# Patient Record
Sex: FEMALE | Race: WHITE
[De-identification: names, ages, dates, MRNs, and addresses within clinical notes are randomized per-mention and may not be internally consistent; named-entity substitution may affect disease eponyms.]

---

## 2018-12-25 ENCOUNTER — HOSPITAL ENCOUNTER (EMERGENCY)
Dept: HOSPITAL 62 - ER | Age: 28
LOS: 1 days | Discharge: HOME | End: 2018-12-26
Payer: COMMERCIAL

## 2018-12-25 DIAGNOSIS — Z79.82: ICD-10-CM

## 2018-12-25 DIAGNOSIS — R11.0: ICD-10-CM

## 2018-12-25 DIAGNOSIS — R51: ICD-10-CM

## 2018-12-25 DIAGNOSIS — R42: Primary | ICD-10-CM

## 2018-12-25 LAB
ADD MANUAL DIFF: NO
ALBUMIN SERPL-MCNC: 4.3 G/DL (ref 3.5–5)
ALP SERPL-CCNC: 65 U/L (ref 38–126)
ALT SERPL-CCNC: 21 U/L (ref 9–52)
ANION GAP SERPL CALC-SCNC: 7 MMOL/L (ref 5–19)
APPEARANCE UR: CLEAR
APTT BLD: 27.7 SEC (ref 23.5–35.8)
APTT PPP: (no result) S
AST SERPL-CCNC: 19 U/L (ref 14–36)
BASOPHILS # BLD AUTO: 0 10^3/UL (ref 0–0.2)
BASOPHILS NFR BLD AUTO: 0.3 % (ref 0–2)
BILIRUB DIRECT SERPL-MCNC: 0.2 MG/DL (ref 0–0.4)
BILIRUB SERPL-MCNC: 0.4 MG/DL (ref 0.2–1.3)
BILIRUB UR QL STRIP: NEGATIVE
BUN SERPL-MCNC: 10 MG/DL (ref 7–20)
CALCIUM: 9.6 MG/DL (ref 8.4–10.2)
CHLORIDE SERPL-SCNC: 105 MMOL/L (ref 98–107)
CO2 SERPL-SCNC: 24 MMOL/L (ref 22–30)
EOSINOPHIL # BLD AUTO: 0 10^3/UL (ref 0–0.6)
EOSINOPHIL NFR BLD AUTO: 0.3 % (ref 0–6)
ERYTHROCYTE [DISTWIDTH] IN BLOOD BY AUTOMATED COUNT: 12.6 % (ref 11.5–14)
GLUCOSE SERPL-MCNC: 103 MG/DL (ref 75–110)
GLUCOSE UR STRIP-MCNC: NEGATIVE MG/DL
HCT VFR BLD CALC: 41.6 % (ref 36–47)
HGB BLD-MCNC: 14.4 G/DL (ref 12–15.5)
INR PPP: 0.92
KETONES UR STRIP-MCNC: NEGATIVE MG/DL
LYMPHOCYTES # BLD AUTO: 1.2 10^3/UL (ref 0.5–4.7)
LYMPHOCYTES NFR BLD AUTO: 14 % (ref 13–45)
MCH RBC QN AUTO: 30.1 PG (ref 27–33.4)
MCHC RBC AUTO-ENTMCNC: 34.5 G/DL (ref 32–36)
MCV RBC AUTO: 87 FL (ref 80–97)
MONOCYTES # BLD AUTO: 0.4 10^3/UL (ref 0.1–1.4)
MONOCYTES NFR BLD AUTO: 4 % (ref 3–13)
NEUTROPHILS # BLD AUTO: 7.2 10^3/UL (ref 1.7–8.2)
NEUTS SEG NFR BLD AUTO: 81.4 % (ref 42–78)
NITRITE UR QL STRIP: NEGATIVE
PH UR STRIP: 8 [PH] (ref 5–9)
PLATELET # BLD: 259 10^3/UL (ref 150–450)
POTASSIUM SERPL-SCNC: 4.3 MMOL/L (ref 3.6–5)
PROT SERPL-MCNC: 7.1 G/DL (ref 6.3–8.2)
PROT UR STRIP-MCNC: NEGATIVE MG/DL
PROTHROMBIN TIME: 12.8 SEC (ref 11.4–15.4)
RBC # BLD AUTO: 4.77 10^6/UL (ref 3.72–5.28)
SODIUM SERPL-SCNC: 136.2 MMOL/L (ref 137–145)
SP GR UR STRIP: 1.01
TOTAL CELLS COUNTED % (AUTO): 100 %
UROBILINOGEN UR-MCNC: NEGATIVE MG/DL (ref ?–2)
WBC # BLD AUTO: 8.8 10^3/UL (ref 4–10.5)

## 2018-12-25 PROCEDURE — 81001 URINALYSIS AUTO W/SCOPE: CPT

## 2018-12-25 PROCEDURE — 96375 TX/PRO/DX INJ NEW DRUG ADDON: CPT

## 2018-12-25 PROCEDURE — 70498 CT ANGIOGRAPHY NECK: CPT

## 2018-12-25 PROCEDURE — 85025 COMPLETE CBC W/AUTO DIFF WBC: CPT

## 2018-12-25 PROCEDURE — 96374 THER/PROPH/DIAG INJ IV PUSH: CPT

## 2018-12-25 PROCEDURE — 85610 PROTHROMBIN TIME: CPT

## 2018-12-25 PROCEDURE — 99284 EMERGENCY DEPT VISIT MOD MDM: CPT

## 2018-12-25 PROCEDURE — 85730 THROMBOPLASTIN TIME PARTIAL: CPT

## 2018-12-25 PROCEDURE — 70496 CT ANGIOGRAPHY HEAD: CPT

## 2018-12-25 PROCEDURE — 80053 COMPREHEN METABOLIC PANEL: CPT

## 2018-12-25 PROCEDURE — 81025 URINE PREGNANCY TEST: CPT

## 2018-12-25 PROCEDURE — 96361 HYDRATE IV INFUSION ADD-ON: CPT

## 2018-12-25 PROCEDURE — 36415 COLL VENOUS BLD VENIPUNCTURE: CPT

## 2018-12-26 VITALS — DIASTOLIC BLOOD PRESSURE: 76 MMHG | SYSTOLIC BLOOD PRESSURE: 123 MMHG

## 2018-12-26 NOTE — RADIOLOGY REPORT (SQ)
EXAM DESCRIPTION: 



CT NECK ANGIOGRAPHY WITHOUT THEN WITH IV CONTRAST



COMPLETED DATE/TME:  12/25/2018 22:06



CLINICAL HISTORY: 



27 years, Female, vertebral art diss



COMPARISON:

None.



TECHNIQUE:

762  Images stored on PACS.

 

All CT scanners at this facility use dose modulation, iterative

reconstruction, and/or weight based dosing when appropriate to

reduce radiation dose to as low as reasonably achievable

(ALARA).. Axial images were obtained with coronal and sagittal

MIPS reconstructions, performed on a dedicated workstation. 



[facility]: Dose Right [facility]: CareDose   [facility]: Dose Right    [facility]:

Teradose 4D    [facility]: Smart Technologies



LIMITATIONS:

None.



FINDINGS:



CTA neck:



The origins of the great vessels are widely patent. The cervical

portions of the vertebral arteries are codominant and widely

patent. The bilateral common carotid arteries and cervical

portions of the internal carotid arteries are widely patent. No

areas of stenosis, vascular encasement, or displacement.



CTA brain:



The vertebral basilar system is unremarkable. Negative for

basilar tip aneurysm. There is significant venous interference,

however as visualized the [place] of [place] is intact and

unremarkable. No CTA evidence for aneurysm or stenosis. Prominent

draining vein in the left posterior fossa incidentally noted.





IMPRESSION:



Unremarkable CTA of the head and neck.

 

TECHNICAL DOCUMENTATION:



Quality ID # 436: Final reports with documentation of one or more

dose reduction techniques (e.g., Automated exposure control,

adjustment of the mA and/or kV according to patient size, use of

iterative reconstruction technique)



copyright 2011 [employer]- All Rights Reserved

## 2018-12-26 NOTE — RADIOLOGY REPORT (SQ)
EXAM DESCRIPTION: 



CT NECK ANGIOGRAPHY WITHOUT THEN WITH IV CONTRAST



COMPLETED DATE/TME:  12/25/2018 22:06



CLINICAL HISTORY: 



27 years, Female, vertebral art diss



COMPARISON:

None.



TECHNIQUE:

762  Images stored on PACS.

 

All CT scanners at this facility use dose modulation, iterative

reconstruction, and/or weight based dosing when appropriate to

reduce radiation dose to as low as reasonably achievable

(ALARA).. Axial images were obtained with coronal and sagittal

MIPS reconstructions, performed on a dedicated workstation. 



[facility]: Dose Right [facility]: CareDose   [facility]: Dose Right    [facility]:

Teradose 4D    [facility]: [employer]



LIMITATIONS:

None.



FINDINGS:



CTA neck:



The origins of the great vessels are widely patent. The cervical

portions of the vertebral arteries are codominant and widely

patent. The bilateral common carotid arteries and cervical

portions of the internal carotid arteries are widely patent. No

areas of stenosis, vascular encasement, or displacement.



CTA brain:



The vertebral basilar system is unremarkable. Negative for

basilar tip aneurysm. There is significant venous interference,

however as visualized the [place] of [place] is intact and

unremarkable. No CTA evidence for aneurysm or stenosis. Prominent

draining vein in the left posterior fossa incidentally noted.





IMPRESSION:



Unremarkable CTA of the head and neck.

 

TECHNICAL DOCUMENTATION:



Quality ID # 436: Final reports with documentation of one or more

dose reduction techniques (e.g., Automated exposure control,

adjustment of the mA and/or kV according to patient size, use of

iterative reconstruction technique)



copyright 2011 [employer]- All Rights Reserved

## 2018-12-26 NOTE — ER DOCUMENT REPORT
ED General





- General


Chief Complaint: Dizziness


Stated Complaint: DIZZINESS,NAUSEA,WEAKNESS


Time Seen by Provider: 12/25/18 21:51


Notes: 





Patient is a 27-year-old female presents to the emergency department for 

generalized headache.  Patient states she was in an emergency room in [facility] approximately 3 weeks ago and was diagnosed with a vertebral artery 

dissection.  Patient states at that time she had a generalized headache, 

dizziness, nausea.  States she was told to take aspirin on a daily basis and 

follow-up with neurology.  States she has a appointment with a neurologist in 

[facility] on January 21.  Patient states she is in [place] visiting 

family for the holidays states this evening she developed a throbbing headache 

all over her head with some associated nausea.  States she presents to the 

emergency room because she was worried that her vertebral artery was dissecting.


Patient denies fever, vision disturbances, upper respiratory symptoms, chest 

pain, shortness of breath.


Past medical history:


Narcolepsy, gastroparesis, anxiety


Medications: Singulair, Cymbalta, aspirin


Allergies: Flagyl, Wellbutrin, Reglan





TRAVEL OUTSIDE OF THE [place] IN LAST 30 DAYS: No





- Related Data


Allergies/Adverse Reactions: 


                                        





bupropion [From Wellbutrin] Allergy (Verified 12/25/18 21:15)


   


metronidazole [From Flagyl] Allergy (Verified 12/25/18 21:15)


   











Past Medical History





- General


Information source: Patient





- Social History


Smoking Status: Never Smoker


Chew tobacco use (# tins/day): No


Frequency of alcohol use: Occasional


Drug Abuse: None


Family History: Reviewed & Not Pertinent


Patient has suicidal ideation: No


Patient has homicidal ideation: No


Renal/ Medical History: Denies: Hx Peritoneal Dialysis





Review of Systems





- Review of Systems


Constitutional: No symptoms reported


EENT: No symptoms reported


Cardiovascular: No symptoms reported


Respiratory: No symptoms reported


Gastrointestinal: See HPI


Genitourinary: No symptoms reported


Female Genitourinary: No symptoms reported


Musculoskeletal: No symptoms reported


Skin: No symptoms reported


Hematologic/Lymphatic: No symptoms reported


Neurological/Psychological: See HPI





Physical Exam





- Vital signs


Vitals: 


                                        











Temp Pulse Resp BP Pulse Ox


 


 97.7 F   114 H  16   139/85 H  100 


 


 12/25/18 21:12  12/25/18 21:12  12/25/18 21:12  12/25/18 21:12  12/25/18 21:12














- Notes


Notes: 





GENERAL: Alert, interacts well. No acute distress.


HEAD: Normocephalic, atraumatic.


EYES: Pupils equal, round, and reactive to light. Extraocular movements intact. 

No photophobia noted


ENT: Oral mucosa moist, tongue midline. 


NECK: Full range of motion. Supple. Trachea midline.


LUNGS: Clear to auscultation bilaterally, no wheezes, rales, or rhonchi. No 

respiratory distress.


HEART: Regular rate and rhythm. No murmur


ABDOMEN: Soft, non-tender. Non-distended. Bowel sounds present in all 4 

quadrants.


EXTREMITIES: Moves all 4 extremities spontaneously. No edema, normal radial and 

dorsalis pedis pulses bilaterally. No cyanosis.  5 out of 5 strength all 4 

extremities


BACK: no cervical, thoracic, lumbar midline tenderness. No saddle anesthesia, 

normal distal neurovascular exam. 


NEUROLOGICAL: Alert and oriented x3. Normal speech. cranial nerves II through 

XII grossly intact 


PSYCH: Normal affect, normal mood.


SKIN: Warm, dry, normal turgor. No rashes or lesions noted.








Course





- Re-evaluation


Re-evalutation: 





12/26/18 00:22


Patient states after treatments in the emergency room her headache has since 

resolved.  States she now just feels thirsty.  Discussed with her she should 

drink plenty of fluids and continue to take Tylenol Motrin for headaches at 

home.  Discussed her CTA results were unremarkable at this time.  Discussed need

to continue to follow-up with neurology and continue their treatment plan until 

you do follow-up.





Labs show no signs of leukocytosis, no signs of anemia, no signs of urinary 

tract infection, minor hyponatremia noted which was treated with normal saline 

in the emergency room.  Patient stable for discharge.








- Vital Signs


Vital signs: 


                                        











Temp Pulse Resp BP Pulse Ox


 


 98.1 F   99   20   123/76   100 


 


 12/26/18 00:42  12/25/18 23:14  12/26/18 00:42  12/26/18 00:42  12/26/18 00:42














- Laboratory


Result Diagrams: 


                                 12/25/18 22:39





                                 12/25/18 22:39


Laboratory results interpreted by me: 


                                        











  12/25/18 12/25/18





  22:39 22:39


 


Seg Neutrophils %  81.4 H 


 


Sodium   136.2 L














Discharge





- Discharge


Clinical Impression: 


Headache


Qualifiers:


 Headache type: unspecified Headache chronicity pattern: acute headache 

Intractability: not intractable Qualified Code(s): R51 - Headache





Condition: Stable


Disposition: HOME, SELF-CARE


Instructions:  Intravenous Compazine for Headaches ([facility]), Headache ([facility])


Additional Instructions: 


As we discussed you have been seen and treated in the emergency department for 

your generalized headache.  Your CT shows no signs of abnormalities at this 

time.  Please make sure you continue your appointments with neurology and 

continue the treatment plan.  Please return to the emergency room should you 

have any other concerning signs or symptoms please stay well-hydrated and take 

Tylenol or Motrin for your generalized headaches..





Prescriptions: 


Ondansetron [Zofran Odt 4 mg Tablet] 1 - 2 tab PO Q4H PRN #15 tab.rapdis


 PRN Reason: For Nausea/Vomiting